# Patient Record
(demographics unavailable — no encounter records)

---

## 2025-05-01 NOTE — ASSESSMENT
[FreeTextEntry1] : 77 yo female who presents to IM clinic to establish care after ED visit on 4/7/25 for viral symptoms, conjunctivitis found to have worsening R. eye pain that is injected with blindness.   #Bacterial conjunctivitis failed topical polymixin drop therapy, ongoing significant scleral injection with visual disturbance for weeks - has been taking polymixin B since the 7th without resolution of sx's staff has arranged for upstairs evaluation shortly with ophthalmologist  #chronic HTN, likely stage 2 - Bp has been elevated in multiple urgent care trips  - BP today 194/89 - start amlodipine 5mg and begin bp monitoring x 2 weeks logs at home will obtain baseline labs and see which additional agents can safely add, will need combo therapy  #HCM - return to clinic in 1 month with labs and bp logs  plan of care also discussed with family at bedside with patient consent

## 2025-05-01 NOTE — END OF VISIT
[] : Resident [FreeTextEntry3] : I saw the patient, examined the patient independently, reviewed medical record, and provided the medical services. Agree with resident note as personally edited above. 79yo F with no consistent medical follow up, no age appropriate cancer screening, not on maintenance medications is here to establish care as a HFU visit from early march R eye injection refractory to prolonged course of polymixin drops with vision affected, and recurring pain ophthal eval asap arranged upstairs for today significant scleral injection on exam  chronic uncontrolled htn plan as above, obtain baseline labs rtc within 1 mo for bp log and labwork review. will need maintenance medications tx trial of moderate dose ccb as a start, with understanding will need to escalate s/p labwork

## 2025-05-01 NOTE — PHYSICAL EXAM
[No Acute Distress] : no acute distress [Normal Outer Ear/Nose] : the outer ears and nose were normal in appearance [No Respiratory Distress] : no respiratory distress  [Clear to Auscultation] : lungs were clear to auscultation bilaterally [Normal Rate] : normal rate  [Regular Rhythm] : with a regular rhythm [No Edema] : there was no peripheral edema [Soft] : abdomen soft [Non-distended] : non-distended [No CVA Tenderness] : no CVA  tenderness [No Focal Deficits] : no focal deficits [de-identified] : Right eye has scleral injection  significant, with tearing

## 2025-05-01 NOTE — HISTORY OF PRESENT ILLNESS
[FreeTextEntry1] : 79 yo female who presents to IM clinic to establish care after ED visit on 4/7/25 for viral symptoms, conjunctivitis, headache and HTN.  [de-identified] : 79 yo female with PMH of HTN who presents to IM clinic to establish care after ED visit on 4/7/25 for viral symptoms, conjunctivitis, headache and HTN. Has not had a primary care doctor in a long time and currently does not take any medications. Her right eye has been swollen and painful. She was given polymyxin B in the ED and has been taking it since April 7th, but has been getting worse. She states she can not see through that eye. She denies fevers, chills, vomiting, diarrhea, chest pain, SOB.

## 2025-05-01 NOTE — REVIEW OF SYSTEMS
[Discharge] : discharge [Pain] : pain [Fever] : no fever [Chills] : no chills [Earache] : no earache [Chest Pain] : no chest pain [Palpitations] : no palpitations [Shortness Of Breath] : no shortness of breath [Wheezing] : no wheezing [Constipation] : no constipation [Diarrhea] : diarrhea [Dysuria] : no dysuria [Incontinence] : no incontinence [Joint Pain] : no joint pain [Skin Rash] : no skin rash [Headache] : no headache [FreeTextEntry3] : Right eye

## 2025-06-06 NOTE — PHYSICAL EXAM
[de-identified] : Crackles up to the midlung bilaterally [de-identified] : 1+ pitting edema of the LE

## 2025-06-06 NOTE — PHYSICAL EXAM
[de-identified] : Crackles up to the midlung bilaterally [de-identified] : 1+ pitting edema of the LE

## 2025-06-06 NOTE — REVIEW OF SYSTEMS
[Fever] : no fever [Chills] : no chills [Chest Pain] : no chest pain [Palpitations] : no palpitations [Leg Claudication] : no leg claudication [Orthopnea] : no orthopnea [Shortness Of Breath] : no shortness of breath [Wheezing] : no wheezing [Cough] : no cough [Dyspnea on Exertion] : no dyspnea on exertion [Abdominal Pain] : no abdominal pain [Nausea] : no nausea [Constipation] : no constipation [Diarrhea] : diarrhea [Vomiting] : no vomiting [Dysuria] : no dysuria [Frequency] : no frequency [Headache] : no headache [Dizziness] : no dizziness

## 2025-06-06 NOTE — ASSESSMENT
[FreeTextEntry1] : A 80 yo female with PMH of HTN with complicated retinopathy, acute angle closure glaucoma who presents to follow up.   #Volume overload #HF? - Patient has no exertional chest pain, dyspnea or dizziness, - Crackles and pitting edema of the lower extermities - no proteinuria or abnormal LFTs - EKG performed in office showing sinus rhythm, no LVH, incomplete RBBB, LA deviation, no repolarisation abnormalities or Q waves - BNP, xray chest and echo for next visit - Cardiology referral - Started on lasix 20mg po for 1 week  # Acute angle closure glaucoma # Hypertensive retinopathy  - Started on diamox 250 mg TID - Continue Prednisolone, Timolol, Alphagan, latanoprost and pilocarpine as per ophthalmology - Follow up with ophthalmologist as needed   # HTN - uncontrolled - BP today 146/73 - amlodipine 5 switched to combination of amlodipine 5 and benzaapril 10 - Reordered pressure cuff - BP log at home  HTN; DASH diet discussed and recommended Exercise and weight loss counseled  Frequency and target at home BP readings discussed Decrease caffeine intake Treatment options and possible side effects discussed Patient counseled on symptoms of hypo/hypertension Counseled: Yearly Ophthalmology exams  #HCM - Mammogram:  patient is 79, does not need mammo - Colonoscopy: follow up next visit - PAP smear: Patient is over the age of 65. Does not need pap - Follow up in 2 weeks

## 2025-06-06 NOTE — END OF VISIT
[] : Resident [FreeTextEntry3] : I saw the patient, examined the patient independently, reviewed medical record, and provided the medical services. Agree with resident note as personally edited above. care discussed with family at bedside as well with patient consent eye erythema and discomfort completely resolved bp's improved but not yet controlled suspect need for combo agent, as patient is stage 2 htn bp logs on the combo cxr see why volume overload- suspect diastolic dysfunction from hypertensive cardiomyopathy but this is not substantiated with echo or known history

## 2025-06-06 NOTE — HISTORY OF PRESENT ILLNESS
[FreeTextEntry1] : Follow up  [de-identified] : A 80 yo female with PMH of HTN with complicated retinopathy, acute angle closure glaucoma who presents to follow up.   Patient reports that she has been doing well. Taking the amlodipine 5. She has no blood pressure cuff at home. she reported that last time they asked her for 100 USD in the pharmacy and cannot afford it. Patient also has been seen by ophthalmology and was found to have hypertensive retinopathy and acute angle closure glaucoma. Patient was started on diamox 250 mg TID,  Timolol, Alphagan, latanoprost and pilocarpine as per ophthalmology. She also had laser therapy on one eye and is planned for another laser therapy on the other side.  Today patient has no complaints. Reports feeling well. ROS was negative

## 2025-06-06 NOTE — HISTORY OF PRESENT ILLNESS
[FreeTextEntry1] : Follow up  [de-identified] : A 80 yo female with PMH of HTN with complicated retinopathy, acute angle closure glaucoma who presents to follow up.   Patient reports that she has been doing well. Taking the amlodipine 5. She has no blood pressure cuff at home. she reported that last time they asked her for 100 USD in the pharmacy and cannot afford it. Patient also has been seen by ophthalmology and was found to have hypertensive retinopathy and acute angle closure glaucoma. Patient was started on diamox 250 mg TID,  Timolol, Alphagan, latanoprost and pilocarpine as per ophthalmology. She also had laser therapy on one eye and is planned for another laser therapy on the other side.  Today patient has no complaints. Reports feeling well. ROS was negative